# Patient Record
Sex: FEMALE | Race: WHITE | Employment: OTHER | RURAL
[De-identification: names, ages, dates, MRNs, and addresses within clinical notes are randomized per-mention and may not be internally consistent; named-entity substitution may affect disease eponyms.]

---

## 2018-04-24 ENCOUNTER — TELEPHONE (OUTPATIENT)
Dept: FAMILY MEDICINE CLINIC | Age: 63
End: 2018-04-24

## 2018-04-24 NOTE — TELEPHONE ENCOUNTER
----- Message from Julia Vizcarra sent at 4/24/2018  4:39 PM EDT -----  Regarding: Dr. Moustapha Bueno is requesting a call back regarding receiving steroid shots. Pt contact [phone].

## 2018-04-25 NOTE — TELEPHONE ENCOUNTER
Attempted to call. No answer. Unable to leave message. Patient is considered a new patient and will need an appointment. Steroid injections not done on new patient appointment times.